# Patient Record
Sex: MALE | Race: ASIAN
[De-identification: names, ages, dates, MRNs, and addresses within clinical notes are randomized per-mention and may not be internally consistent; named-entity substitution may affect disease eponyms.]

---

## 2019-12-17 ENCOUNTER — HOSPITAL ENCOUNTER (EMERGENCY)
Dept: HOSPITAL 76 - ED | Age: 24
Discharge: HOME | End: 2019-12-17
Payer: COMMERCIAL

## 2019-12-17 VITALS — SYSTOLIC BLOOD PRESSURE: 133 MMHG | DIASTOLIC BLOOD PRESSURE: 80 MMHG

## 2019-12-17 DIAGNOSIS — S42.302A: Primary | ICD-10-CM
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
## 2019-12-17 PROCEDURE — 99283 EMERGENCY DEPT VISIT LOW MDM: CPT

## 2019-12-17 PROCEDURE — 99282 EMERGENCY DEPT VISIT SF MDM: CPT

## 2019-12-17 NOTE — ED PHYSICIAN DOCUMENTATION
History of Present Illness





- Stated complaint


Stated Complaint: LT ARM SWELLING





- Chief complaint


Chief Complaint: Trauma Ext





- History obtained from


History obtained from: Patient





- History of Present Illness


Timing: How many days ago (several days)


Pain level max: 7


Pain level now: 5





- Additonal information


Additional information: 





24-year-old [employer] male presents to the emergency department after 

sustaining a left arm humerus fracture while he was in [place]. states L arm 

is swollen and bruised





Review of Systems


Constitutional: denies: Fever, Chills


GI: denies: Vomiting





PD PAST MEDICAL HISTORY





- Past Medical History


Past Medical History: No


Cardiovascular: None


Respiratory: None


Neuro: None


Endocrine/Autoimmune: None


GI: None


: None


HEENT: None


Psych: None


Musculoskeletal: None


Derm: None





- Past Surgical History


Past Surgical History: Yes





- Present Medications


Home Medications: 


                                Ambulatory Orders











 Medication  Instructions  Recorded  Confirmed


 


Ibuprofen [Motrin] 800 mg PO Q8H PRN #30 tablet 12/17/19 


 


Hydrocodone/Acetaminophen 1 each PO PRN 12/18/19 





[Hydrocodone-Acetamin 5-325 mg]   














- Allergies


Allergies/Adverse Reactions: 


                                    Allergies











Allergy/AdvReac Type Severity Reaction Status Date / Time


 


No Known Drug Allergies Allergy   Verified 12/18/19 12:48














- Social History


Does the pt smoke?: No


Smoking Status: Never smoker


Does the pt drink ETOH?: Yes


Does the pt have substance abuse?: No





- Immunizations


Immunizations are current?: Yes





- POLST


Patient has POLST: No





PD ED PE NORMAL





- Vitals


Vital signs reviewed: Yes





- General


General: Alert and oriented X 3, No acute distress, Well developed/nourished





- HEENT


HEENT: Moist mucous membranes





- Neck


Neck: Supple, no meningeal sign





- Derm


Derm: Warm and dry





- Extremities


Extremities: Other (L arm in a brace on the upper arm. mild swelling and 

ecchymosis distal to this. NVI. compartments soft. )





- Neuro


Neuro: Alert and oriented X 3





- Psych


Psych: Normal mood, Normal affect





Results





- Vitals


Vitals: 


                               Vital Signs - 24 hr











  12/17/19





  21:19


 


Temperature 37.2 C


 


Heart Rate 77


 


Respiratory 16





Rate 


 


Blood Pressure 133/80 H


 


O2 Saturation 99








                                     Oxygen











O2 Source                      Room air

















PD MEDICAL DECISION MAKING





- ED course


Complexity details: considered differential, d/w patient


ED course: 





Patient with a known humerus fracture.  He is seen orthopedics in the morning.  

We will have him elevate the arm at home.  No evidence of compartment syndrome. 

No evidence of infection.  Patient counseled regarding signs and symptoms for 

which I believe and urgent re-evaluation would be necessary. Patient with good 

understanding of and agreement to plan and is comfortable going home at this 

time





This document was made in part using voice recognition software. While efforts 

are made to proofread this document, sound alike and grammatical errors may 

occur.





Departure





- Departure


Disposition: 01 Home, Self Care


Clinical Impression: 


Humerus shaft fracture


Qualifiers:


 Encounter type: initial encounter Fracture type: closed Fracture morphology: 

unspecified fracture morphology Laterality: left Qualified Code(s): S42.302A - 

Unspecified fracture of shaft of humerus, left arm, initial encounter for closed

fracture





Condition: Good


Instructions:  ED Fx Upper Ext


Follow-Up: 


Kayode Gaxiola MD [Primary Care Provider] - Tomorrow


Prescriptions: 


Ibuprofen [Motrin] 800 mg PO Q8H PRN #30 tablet


 PRN Reason: PAIN &/OR FEVER


Comments: 


Return if you worsen.  Follow-up with orthopedics tomorrow.  Continue to elevate

 your left upper extremity whenever possible.


Discharge Date/Time: 12/17/19 22:05

## 2019-12-20 ENCOUNTER — HOSPITAL ENCOUNTER (OUTPATIENT)
Dept: HOSPITAL 76 - SDS | Age: 24
Discharge: HOME | End: 2019-12-20
Attending: ORTHOPAEDIC SURGERY
Payer: COMMERCIAL

## 2019-12-20 VITALS — DIASTOLIC BLOOD PRESSURE: 72 MMHG | SYSTOLIC BLOOD PRESSURE: 142 MMHG

## 2019-12-20 DIAGNOSIS — S42.322A: Primary | ICD-10-CM

## 2019-12-20 PROCEDURE — 0PSG04Z REPOSITION LEFT HUMERAL SHAFT WITH INTERNAL FIXATION DEVICE, OPEN APPROACH: ICD-10-PCS | Performed by: ORTHOPAEDIC SURGERY

## 2019-12-20 RX ADMIN — HYDROMORPHONE HYDROCHLORIDE ONE MG: 1 INJECTION, SOLUTION INTRAMUSCULAR; INTRAVENOUS; SUBCUTANEOUS at 18:57

## 2019-12-20 RX ADMIN — HYDROMORPHONE HYDROCHLORIDE ONE MG: 1 INJECTION, SOLUTION INTRAMUSCULAR; INTRAVENOUS; SUBCUTANEOUS at 18:52
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
## 2019-12-20 NOTE — ANESTHESIA
Pre-Anesthesia VS, & Labs





- Diagnosis





Left humeral shaft fracture





- Procedure





open treatment of left humeral shaft fracture


Vital Signs: 





                                        











Temp Pulse Resp BP Pulse Ox


 


 36.4 C L  88   16   151/106 H  98 


 


 12/20/19 10:52  12/20/19 10:52  12/20/19 10:52  12/20/19 10:52  12/20/19 10:52














                                        





Height                           6 ft 0.83 in


Weight (kg)                      90.5 kg


Body Mass Index                  25.7











- NPO


>8 hours





Home Medications and Allergies


Home Medications: 


Ambulatory Orders





Hydrocodone/Acetaminophen [Hydrocodone-Acetamin 5-325 mg] 1 each PO PRN 12/18/19




Docusate Sodium 250Mg Capsule [Colace 250Mg Capsule] 1 DAILY 12/20/19 











                                        





Hydrocodone/Acetaminophen [Hydrocodone-Acetamin 5-325 mg] 1 each PO PRN 12/18/19




Docusate Sodium 250Mg Capsule [Colace 250Mg Capsule] 1 DAILY 12/20/19 








Allergies/Adverse Reactions: 


                                    Allergies











Allergy/AdvReac Type Severity Reaction Status Date / Time


 


No Known Drug Allergies Allergy   Verified 12/18/19 12:48














Anes History & Medical History





- Anesthetic History


Anesthesia Complications: reports: No previous complications





- Medical History


Cardiovascular: reports: None


Pulmonary: reports: None


Gastrointestinal: reports: None


Urinary: reports: None


Neuro: reports: None


Musculoskeletal: reports: None, Other


Endocrine/Autoimmune: reports: None


Blood Disorders: reports: None


Skin: reports: None


Smoking Status: Never smoker


Psychosocial: reports: Alcohol (2-4 drinks per week)





- Surgical History


Eyes Ears Nose Throat (EENT): Tonsil/Adenoidectomy





Exam


General: Alert, Oriented x3, Cooperative, No acute distress


Dental: WNL


Mouth Opening: 3 Fingerbreadth


Neck Mobility: Normal


Mallampati classification: I


Thyromental Distance: greater than 6 cm


Respiratory: Lungs clear, Normal breath sounds, No respiratory distress, No 

accessory muscle use


Cardiovascular: Regular rate, Normal S1, Normal S2, No murmurs


Mental/Cognitive Status: Alert/Oriented X3, Normal for patient





Plan


Anesthesia Type: General


Consent for Procedure(s) Verified and Reviewed: Yes


Code Status: Attempt Resuscitation


ASA classification: 1-Healthy patient


Is this case an emergency?: No

## 2019-12-23 NOTE — XRAY REPORT
Reason:  left humerus orif

Procedure Date:  12/20/2019   

Accession Number:  [id number] / [id number]                    

Procedure:  FL  - OR C-Arm Procedure CPT Code:  

 

***Final Report***

 

 

FULL RESULT:

 

 

EXAM:

FLUOROSCOPIC GUIDANCE

 

EXAM DATE: 12/20/2019 06:42 PM.

 

CLINICAL HISTORY: Left humerus ORIF.

 

COMPARISON: None.

IMPRESSION: Fluoroscopic guidance provided for ORIF left humerus. Total 

fluoroscopy time: 18 seconds.  Number of images: 2.

 

[facility]

## 2022-10-29 ENCOUNTER — HOSPITAL ENCOUNTER (OUTPATIENT)
Dept: HOSPITAL 76 - DI | Age: 27
Discharge: HOME | End: 2022-10-29
Attending: HEALTH CARE PROVIDER
Payer: COMMERCIAL

## 2022-10-29 DIAGNOSIS — M25.462: ICD-10-CM

## 2022-10-29 DIAGNOSIS — S83.511A: Primary | ICD-10-CM

## 2022-10-29 DIAGNOSIS — S83.411A: ICD-10-CM

## 2022-10-31 NOTE — MRI REPORT
PROCEDURE:  KNEE WO - RT

 

INDICATIONS:  KNEE PAIN

 

TECHNIQUE:  

Noncontrast sagittal PD fast spin echo and T2 fast spin echo with fat saturation, sagittal 3-D gradie
nt sequence with fat saturation; coronal T1 spin echo and PD fast spin echo with fat saturation, and 
axial PD fast spin echo with fat saturation through the knee.  

 

COMPARISON:  None.

 

FINDINGS:  

Image quality:  Excellent.  

 

Menisci: Peripheral displacement of medial meniscus bowing medial collateral ligament is seen. The me
dial and lateral menisci are intact..  The meniscal root ligaments appear intact.  

 

Cruciate ligaments: There is full-thickness rupture of anterior cruciate ligament near its femoral in
sertion. Posterior cruciate ligament is intact.

 

Medial structures: Low to moderate grade MCL sprain/partial thickness tear is noted. The posterior ob
lique ligament, semimembranosus tendon insertions, and oblique popliteal ligament, and meniscocapsula
r junction appear intact.  Visualized portions of the pes anserinus tendons appear normal.  No abnorm
al bursal fluid.  

 

Lateral structures:  The lateral collateral ligament, long and short heads of the biceps femoris tend
on appear intact.  The popliteus tendon appears normal; the popliteofibular ligament appears intact. 
Iliotibial band appears normal.  

 

Anterior structures:  The quadriceps and patellar tendons appear intact.  Patellar alignment is oksana
l.  No femoral trochlear dysplasia or ventral trochlear prominence.  No edema in the infrapatellar fa
t pad.  

 

Bones and cartilage: Bony contusion involving lateral weightbearing portion of femoral condyle and po
sterior aspect of proximal tibia extending to posterior aspect of lateral tibial plateau. No cortical
 disruption is seen. No discrete fracture line. Slight anterior tibial translation is seen. Bony cont
usion is also noted involving medial periphery of medial femoral condyle weightbearing portion. The c
artilage of the medial and lateral femorotibial compartments, as well as the patellofemoral compartme
nt, appears normal in thickness.  

 

Joint space:  There is moderate knee joint fluid.  No Baker's cyst.  Normal appearing synovial plicae
 are incidentally noted.  

 

IMPRESSION:

1. Full-thickness rupture of ACL near its femoral insertion with slight anterior tibial translation a
nd bony contusion involving weightbearing portion of lateral femoral condyle and adjacent posterior a
spect of proximal tibia extending to both medial and lateral tibial plateau. No discrete fracture jessica
e is seen. Bony contusion also noted in medial periphery of medial femoral condyle weightbearing port
ion. Articulating cartilages are intact.

2. PCL is intact.

3. No evidence of focal meniscal tear.

4. Low to moderate grade MCL sprain/partial thickness tear.

5. Moderate joint effusion, no gross loose bodies.

 

Reviewed by: Carlos Evangelista MD on 10/31/2022 10:08 AM PDT

Approved by: Carlos Evangelista MD on 10/31/2022 10:08 AM PDT

 

 

Station ID:  [id number]